# Patient Record
Sex: MALE | Race: WHITE | NOT HISPANIC OR LATINO | Employment: PART TIME | URBAN - METROPOLITAN AREA
[De-identification: names, ages, dates, MRNs, and addresses within clinical notes are randomized per-mention and may not be internally consistent; named-entity substitution may affect disease eponyms.]

---

## 2018-01-10 NOTE — RESULT NOTES
Verified Results  * XR FOOT 3+ VIEW LEFT [id number] 12:59PM Kevin Land Order Number: [id number]     Test Name Result Flag Reference   XR FOOT 3+ VW LEFT (Report)     LEFT FOOT     INDICATION: Chronic medial foot pain  COMPARISON: None available  VIEWS: 3; 3 images     FINDINGS:     Bone mineralization within normal limits  No acute fracture or dislocation  No degenerative changes  No lytic or blastic lesions are seen  Bipartite lateral 1st metatarsal sesamoid bone  Tiny calcaneal spurs  IMPRESSION:     No acute osseous abnormality  Workstation performed: [id number]     Signed by:    Christy Omer DO   2/12/16       Discussion/Summary   X ray of Left foot showed tiny heel spur otherwise normal      - Dr Mahendra Ace   Electronically signed by : Jose Sosa MD; Feb 24 2016 10:29AM EST                       (Author)

## 2018-01-12 NOTE — RESULT NOTES
Verified Results  * MRI FOOT/FOREFOOT TOES LEFT W WO CONTRAST [id number] 07:41PM Dash Boop     Test Name Result Flag Reference   MRI FOOT/FOREFOOT TOES LEFT W [address] (Report)     This is a summary report  The complete report is available in the patient's medical record  If you cannot access the medical record, please contact the sending organization for a detailed fax or copy  MRI - LEFT FOOT WITH AND WITHOUT CONTRAST     INDICATION: Ganglion cyst      COMPARISON:  Plain film of the left foot dated 2/12/2016  TECHNIQUE: MR sequences were obtained of the left foot including the following: Precontrast sequences: localizers, sagittal STIR, sagittal T1, coronal T1, coronal T2 fat sat, axial T2 fat sat, axial PD, coronal T1 fat sat  Postcontrast sequences:    Coronal T1 fat sat, sagittal T1 fat sat  Images were acquired on a 1 5 Nicci unit  12 cc of IV Gadavist was given  FINDINGS:     SUBCUTANEOUS TISSUES: Along the dorsal aspect of the 4th and 5th metatarsal bases there is a lobulated cystic appearing lesion exhibiting decreased T1 with increased T2 signal and thin rim enhancement measuring up to 7 x 8 x 8 mm consistent with a    lobulated ganglion cyst      BONE MARROW: Normal signal intensity  FIRST MTP JOINT: Intact  SESAMOID BONES: Intact  PLANTAR FASCIA: Intact  LISFRANC LIGAMENT: Intact  FOREFOOT TENDONS: Intact  INTERMETATARSAL REGIONS: No bursitis or Hutchinson's neuroma  MUSCULATURE: Intact  IMPRESSION:   Lobulated ganglion cyst identified along the dorsal aspect of the 4th and 5th metatarsal bases   Remaining soft tissues are unremarkable without further evidence of ganglion cyst        Workstation performed: [id number]     Signed by:   Naina Schmidt MD   7/29/16     (1) BUN [id number] 10:44AM Dash Boop     Test Name Result Flag Reference   BLOOD UREA NITROGEN 12 mg/dL  5-25     (1) CREATININE [id number] 10:44AM Dash Boop     Test Name Result Flag Reference   CREATININE 0 73 mg/dL  0 60-1 30   Standardized to IDMS reference method   eGFR Non-African American      >60 0 ml/min/1 73sq m   [facility] [employer] Disease Education Program recommendations are as follows:  GFR calculation is accurate only with a steady state creatinine  Chronic Kidney disease less than 60 ml/min/1 73 sq  meters  Kidney failure less than 15 ml/min/1 73 sq  meters

## 2023-12-08 ENCOUNTER — TELEPHONE (OUTPATIENT)
Age: 33
End: 2023-12-08

## 2023-12-08 NOTE — TELEPHONE ENCOUNTER
Patient called to establish care with a new provider. Reviewed chart to schedule appropriate visit type.

## 2023-12-14 ENCOUNTER — OFFICE VISIT (OUTPATIENT)
Dept: FAMILY MEDICINE CLINIC | Facility: CLINIC | Age: 33
End: 2023-12-14
Payer: COMMERCIAL

## 2023-12-14 VITALS
TEMPERATURE: 97.2 F | HEIGHT: 75 IN | BODY MASS INDEX: 27.48 KG/M2 | HEART RATE: 97 BPM | WEIGHT: 221 LBS | SYSTOLIC BLOOD PRESSURE: 128 MMHG | OXYGEN SATURATION: 97 % | DIASTOLIC BLOOD PRESSURE: 82 MMHG

## 2023-12-14 DIAGNOSIS — N45.1 EPIDIDYMITIS: ICD-10-CM

## 2023-12-14 DIAGNOSIS — Z00.00 ANNUAL PHYSICAL EXAM: Primary | ICD-10-CM

## 2023-12-14 DIAGNOSIS — F41.9 ANXIETY AND DEPRESSION: ICD-10-CM

## 2023-12-14 DIAGNOSIS — Z76.89 ENCOUNTER TO ESTABLISH CARE: ICD-10-CM

## 2023-12-14 DIAGNOSIS — F32.A ANXIETY AND DEPRESSION: ICD-10-CM

## 2023-12-14 DIAGNOSIS — M25.561 CHRONIC PAIN OF RIGHT KNEE: ICD-10-CM

## 2023-12-14 DIAGNOSIS — G89.29 CHRONIC PAIN OF RIGHT KNEE: ICD-10-CM

## 2023-12-14 PROCEDURE — 99395 PREV VISIT EST AGE 18-39: CPT | Performed by: NURSE PRACTITIONER

## 2023-12-14 RX ORDER — DOXYCYCLINE HYCLATE 100 MG/1
100 CAPSULE ORAL EVERY 12 HOURS SCHEDULED
Qty: 14 CAPSULE | Refills: 0 | Status: SHIPPED | OUTPATIENT
Start: 2023-12-14 | End: 2023-12-21

## 2023-12-14 RX ORDER — FLUOXETINE 10 MG/1
10 CAPSULE ORAL DAILY
Qty: 30 CAPSULE | Refills: 1 | Status: SHIPPED | OUTPATIENT
Start: 2023-12-14

## 2023-12-14 NOTE — PROGRESS NOTES
[address] [facility]    NAME: Mike Mercer  AGE: 34 y.o. SEX: male  : 1990     DATE: 12/15/2023     Assessment and Plan:     Problem List Items Addressed This Visit    None  Visit Diagnoses     Annual physical exam    -  Primary    Encounter to establish care        Epididymitis        Relevant Medications    doxycycline hyclate (VIBRAMYCIN) 100 mg capsule    Chronic pain of right knee        Relevant Orders    XR knee 3 vw right non injury    Anxiety and depression        Relevant Medications    FLUoxetine (PROzac) 10 mg capsule        #1 Annual physical exam  #2 Encounter to establish care  Patient was previously being followed at [address] [place] [address] -but his grandmother comes to this practice so it was recommended he come here also  #3 Epididymitis  Discussed with patient plan to treat with a 7-day course of doxycycline 100 mg twice a day  #4 Chronic pain of right knee  Discussed with patient plan to start evaluation with a x-ray of the right knee for further evaluation  #5 Anxiety and depression  Discussed with patient plan to start him on fluoxetine 10 mg daily and recheck effectiveness in 1 month  Patient instructed to return in 1 month for mental health follow-up or sooner if needed  Patient encouraged to call office for problems or concerns in the interim    Immunizations and preventive care screenings were discussed with patient today. Appropriate education was printed on patient's after visit summary. Counseling:  Alcohol/drug use: discussed moderation in alcohol intake, the recommendations for healthy alcohol use, and avoidance of illicit drug use. Dental Health: discussed importance of regular tooth brushing, flossing, and dental visits. Exercise: the importance of regular exercise/physical activity was discussed. Recommend exercise 3-5 times per week for at least 30 minutes. BMI Counseling: Body mass index is 27.62 kg/m². The BMI is above normal. Nutrition recommendations include decreasing portion sizes, encouraging healthy choices of fruits and vegetables, consuming healthier snacks, moderation in carbohydrate intake and increasing intake of lean protein. Exercise recommendations include exercising 3-5 times per week and strength training exercises. Rationale for BMI follow-up plan is due to patient being overweight or obese. Depression Screening and Follow-up Plan: Patient's depression screening was positive with a PHQ-2 score of 6. Their PHQ-9 score was 22. Return in 4 weeks (on 1/11/2024), or F/u mental health. Chief Complaint:     Chief Complaint   Patient presents with   • Physical Exam     Sometimes ears pop and hurt when swallowing. Chest pain    • Establish Care   • Testicle Pain     A month ago, right side   • Anxiety   • Abdominal Pain     Right abdominal pain   • Knee Pain     2017, slipped on ice at work. History of Present Illness:     Adult Annual Physical   Patient here to establish care and for a comprehensive physical exam.  Patient in the past has been seen by Dr. Chucky Jones at the [address] [place] [address] but due to his grandmother coming to this office he wanted to switch providers. The patient reports problems - he has been having right testicular pain for the past month . He states that he was smoking marijuana daily and he stopped when he started with the testicular pain and he also stopped his vitamin B complex and his calcium supplement. He stopped smoking 2 months ago and has been vaping and using the "Nestor' to help with his cravings. He also reports having intermittent ear pain and popping when he swallows. It started out in July of this year and just the left ear but in the last few days it seems to have progressed to his right ear also.   Some of his other medical issues he would like to have addressed is in 2017 he slipped on ice and fell onto his knees and since that time he has been having knee pain with the right being the worst, he found a lump on his left ankle near the Achilles that he would like to have assessed and he also having increased anxiety depression. Patient states that most of his anxiety and depression worsening started when his father was diagnosed with non-Hodgkin's and Hodgkin's lymphoma. He reports that he does not see his biological mother very often and believes that she does have some mental health issues. He states that his mother and father  shortly after losing twin girls soon after birth. His mother currently lives in [place] with his stepfather. Diet and Physical Activity  Diet/Nutrition: well balanced diet and consuming 3-5 servings of fruits/vegetables daily. Exercise: physical active job. Depression Screening  PHQ-2/9 Depression Screening    Little interest or pleasure in doing things: 3 - nearly every day  Feeling down, depressed, or hopeless: 3 - nearly every day  Trouble falling or staying asleep, or sleeping too much: 2 - more than half the days  Feeling tired or having little energy: 2 - more than half the days  Poor appetite or overeating: 3 - nearly every day  Feeling bad about yourself - or that you are a failure or have let yourself or your family down: 3 - nearly every day  Trouble concentrating on things, such as reading the newspaper or watching television: 3 - nearly every day  Moving or speaking so slowly that other people could have noticed.  Or the opposite - being so fidgety or restless that you have been moving around a lot more than usual: 3 - nearly every day  Thoughts that you would be better off dead, or of hurting yourself in some way: 0 - not at all  PHQ-2 Score: 6  PHQ-2 Interpretation: POSITIVE depression screen  PHQ-9 Score: 22   PHQ-9 Interpretation: Severe depression        General Health  Sleep: sleeps well and gets 7-8 hours of sleep on average. Hearing: normal - bilateral.  Vision: no vision problems, goes for regular eye exams, most recent eye exam <1 year ago, and wears glasses. Dental: regular dental visits, brushes teeth once daily, and daily flossing .  Health  History of STDs?: no.    Advanced Care Planning  Do you have an advanced directive? no  Do you have a durable medical power of ? no     Review of Systems:     Review of Systems   Constitutional:  Negative for activity change, appetite change and unexpected weight change. HENT:  Negative for congestion, dental problem, ear pain, hearing loss, nosebleeds, sneezing, sore throat, tinnitus and trouble swallowing. Eyes:  Negative for visual disturbance. Respiratory:  Negative for cough, chest tightness, shortness of breath and wheezing. Cardiovascular:  Negative for chest pain, palpitations and leg swelling. Gastrointestinal:  Negative for abdominal distention, abdominal pain, constipation, diarrhea and nausea. Endocrine: Negative for polydipsia, polyphagia and polyuria. Genitourinary:  Positive for testicular pain. Negative for frequency, penile discharge, penile pain, penile swelling, scrotal swelling and urgency. Musculoskeletal:  Positive for arthralgias. Negative for back pain, gait problem, joint swelling, myalgias and neck pain. Skin:  Negative for color change and rash. Allergic/Immunologic: Negative for environmental allergies. Neurological:  Negative for dizziness, weakness, light-headedness and headaches. Hematological: Negative. Psychiatric/Behavioral:  Positive for dysphoric mood. Negative for sleep disturbance. The patient is nervous/anxious. Past Medical History:     Past Medical History:   Diagnosis Date   • GERD (gastroesophageal reflux disease)       Past Surgical History:     History reviewed. No pertinent surgical history.    Social History:     Social History     Socioeconomic History   • Marital status: Single     Spouse name: None   • Number of children: None   • Years of education: None   • Highest education level: None   Occupational History   • None   Tobacco Use   • Smoking status: Every Day     Types: Cigarettes, E-Cigarettes     Passive exposure: Current   • Smokeless tobacco: Current     Types: Chew   • Tobacco comments:     I have been using chew tobacco, cigarettes, and vape since age 27   Vaping Use   • Vaping status: Former   Substance and Sexual Activity   • Alcohol use: No   • Drug use: Not Currently     Types: Marijuana, Methamphetamines     Comment: patient reports marijuana and snorts methamphetamines   • Sexual activity: None   Other Topics Concern   • None   Social History Narrative   • None     Social Determinants of Health     Financial Resource Strain: Not on file   Food Insecurity: Not on file   Transportation Needs: Not on file   Physical Activity: Not on file   Stress: Not on file   Social Connections: Not on file   Intimate Partner Violence: Not on file   Housing Stability: Not on file      Family History:     Family History   Problem Relation Age of Onset   • Migraines Mother    • Alopecia Mother    • Anxiety disorder Mother    • Hodgkin's lymphoma Father    • Heart disease Paternal Grandfather    • Cancer Paternal Grandfather       Current Medications:     Current Outpatient Medications   Medication Sig Dispense Refill   • doxycycline hyclate (VIBRAMYCIN) 100 mg capsule Take 1 capsule (100 mg total) by mouth every 12 (twelve) hours for 7 days 14 capsule 0   • esomeprazole (NexIUM) 40 MG capsule Take 40 mg by mouth every morning before breakfast.     • FLUoxetine (PROzac) 10 mg capsule Take 1 capsule (10 mg total) by mouth daily 30 capsule 1     No current facility-administered medications for this visit. Allergies:      Allergies   Allergen Reactions   • Mushroom Extract Complex - Food Allergy Other (See Comments)     "i think i get an itchy throat"   • Pollen Extract Headache Physical Exam:     /82 (BP Location: Right arm, Patient Position: Sitting, Cuff Size: Large)   Pulse 97   Temp (!) 97.2 °F (36.2 °C)   Ht 6' 3" (1.905 m)   Wt 100 kg (221 lb)   SpO2 97%   BMI 27.62 kg/m² (Reviewed)    Physical Exam  Vitals reviewed. Exam conducted with a chaperone present. Constitutional:       General: He is not in acute distress. Appearance: He is well-developed and well-groomed. He is not ill-appearing. HENT:      Head: Normocephalic and atraumatic. Right Ear: External ear normal.      Left Ear: External ear normal.      Nose: Nose normal.      Mouth/Throat:      Lips: Pink. Mouth: Mucous membranes are moist.      Dentition: Normal dentition. Pharynx: Oropharynx is clear. Eyes:      General: Lids are normal.      Extraocular Movements: Extraocular movements intact. Conjunctiva/sclera: Conjunctivae normal.      Pupils: Pupils are equal, round, and reactive to light. Neck:      Thyroid: No thyromegaly or thyroid tenderness. Trachea: Trachea and phonation normal.   Cardiovascular:      Rate and Rhythm: Normal rate and regular rhythm. Pulses:           Radial pulses are 2+ on the right side and 2+ on the left side. Dorsalis pedis pulses are 2+ on the right side and 2+ on the left side. Posterior tibial pulses are 2+ on the right side and 2+ on the left side. Heart sounds: Normal heart sounds. No murmur heard. Pulmonary:      Effort: Pulmonary effort is normal.      Breath sounds: Normal breath sounds. Abdominal:      General: Abdomen is flat. Bowel sounds are normal. There is no distension. Palpations: Abdomen is soft. Tenderness: There is no abdominal tenderness. Genitourinary:     Penis: Normal.       Testes: Cremasteric reflex is present. Right: Tenderness present. Mass, swelling, testicular hydrocele or varicocele not present.    Musculoskeletal:      Right knee: No swelling, deformity, effusion, bony tenderness or crepitus. Normal range of motion. Tenderness present over the patellar tendon. No LCL laxity, MCL laxity, ACL laxity or PCL laxity. Normal alignment, normal meniscus and normal patellar mobility. Normal pulse. Instability Tests: Anterior drawer test negative. Posterior drawer test negative. Anterior Lachman test negative. Medial Williams test negative and lateral Williams test negative. Right lower leg: No edema. Left lower leg: No edema. Skin:     General: Skin is warm and dry. Capillary Refill: Capillary refill takes less than 2 seconds. Nails: There is no clubbing. Neurological:      Mental Status: He is alert and oriented to person, place, and time. Sensory: Sensation is intact. Motor: Motor function is intact. Coordination: Coordination is intact. Deep Tendon Reflexes: Reflexes are normal and symmetric. Psychiatric:         Mood and Affect: Mood normal.         Speech: Speech normal.         Behavior: Behavior normal. Behavior is cooperative.           [address] [facility]

## 2023-12-21 ENCOUNTER — TELEPHONE (OUTPATIENT)
Age: 33
End: 2023-12-21

## 2023-12-21 NOTE — TELEPHONE ENCOUNTER
"Patient was treated for epididymitis with doxycycline.  He does not feel that it worked because he is still with \"same problem\", still with pain as well...  he looked it up online, it said if it is going on longer than 6 wks that it might be chronic. Would that be different treatment then, he feels like it has been longer than 6 wks.         "

## 2023-12-21 NOTE — TELEPHONE ENCOUNTER
Patient returned call to follow up earlier call and requesting response before he goes to work. 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 Patient reports ongoing testicle pain that is interfering with his life. Does patient require an additional course of antibiotic therapy? Please follow up with patient. If no answer while he at work this afternoon, please leave a voicemail for patient to return call.     If unsuccessful reaching patient this afternoon, please reach out Friday 12/22 between 9 AM-12 noon. Thank you.

## 2023-12-22 ENCOUNTER — TELEPHONE (OUTPATIENT)
Age: 33
End: 2023-12-22

## 2023-12-22 NOTE — TELEPHONE ENCOUNTER
Pt stated there is incorrect information in his chart.  For example, he claims there was incorrect medication info etc. Pt wants to know where info came from and need copies of specific info on his chart.           Please contact patient and advise.     Thank you for your help.

## 2023-12-22 NOTE — TELEPHONE ENCOUNTER
Pt called back. I confirmed we have the correct cell number for him. He did not answer our calls as he did not recognize the number.     Providers recommendation given. He asked if he could go to [facility], I advised he could not as they could not to the appropriate testing. Pt will probably go to [facility].

## 2023-12-22 NOTE — TELEPHONE ENCOUNTER
Patient wants a print out of his chart. Offered and sent [employer] setup, but didn't want to enter SSN. Mailing the patient snapshot.

## 2023-12-22 NOTE — TELEPHONE ENCOUNTER
Attempted to call again same message about google. Asked Serena to try to make sure it wasn't my phone and it would not even ring.

## 2024-01-11 ENCOUNTER — APPOINTMENT (OUTPATIENT)
Dept: RADIOLOGY | Facility: CLINIC | Age: 34
End: 2024-01-11
Payer: COMMERCIAL

## 2024-01-11 DIAGNOSIS — M25.561 CHRONIC PAIN OF RIGHT KNEE: ICD-10-CM

## 2024-01-11 DIAGNOSIS — G89.29 CHRONIC PAIN OF RIGHT KNEE: ICD-10-CM

## 2024-01-11 PROCEDURE — 73562 X-RAY EXAM OF KNEE 3: CPT

## 2024-01-15 ENCOUNTER — OFFICE VISIT (OUTPATIENT)
Dept: FAMILY MEDICINE CLINIC | Facility: CLINIC | Age: 34
End: 2024-01-15
Payer: COMMERCIAL

## 2024-01-15 VITALS
HEIGHT: 75 IN | BODY MASS INDEX: 29.09 KG/M2 | SYSTOLIC BLOOD PRESSURE: 130 MMHG | HEART RATE: 70 BPM | TEMPERATURE: 97.2 F | DIASTOLIC BLOOD PRESSURE: 84 MMHG | WEIGHT: 234 LBS | OXYGEN SATURATION: 100 %

## 2024-01-15 DIAGNOSIS — F32.A ANXIETY AND DEPRESSION: Primary | ICD-10-CM

## 2024-01-15 DIAGNOSIS — J02.9 ACUTE PHARYNGITIS, UNSPECIFIED ETIOLOGY: ICD-10-CM

## 2024-01-15 DIAGNOSIS — N45.1 EPIDIDYMITIS: ICD-10-CM

## 2024-01-15 DIAGNOSIS — F41.9 ANXIETY AND DEPRESSION: Primary | ICD-10-CM

## 2024-01-15 PROCEDURE — 99214 OFFICE O/P EST MOD 30 MIN: CPT | Performed by: NURSE PRACTITIONER

## 2024-01-15 RX ORDER — AZITHROMYCIN 250 MG/1
TABLET, FILM COATED ORAL
Qty: 6 TABLET | Refills: 0 | Status: SHIPPED | OUTPATIENT
Start: 2024-01-15 | End: 2024-01-19

## 2024-01-15 NOTE — PROGRESS NOTES
Assessment/Plan:     Diagnoses and all orders for this visit:    Anxiety and depression    Acute pharyngitis, unspecified etiology  -     azithromycin (ZITHROMAX) 250 mg tablet; Take 2 tabs on Day 1 than 1 tab Days 2-5    Epididymitis  -     US scrotum and groin area; Future        #1 Anxiety and depression  Patient continues to have depression symptoms but feels like the sertraline 10 mg daily and does not want to adjust dosage.   #2 Acute pharyngitis  Discussed with patient plan to treat with a course of azithromycin  #3 Epididymitis  Discussed with patient plan to obtain an ultrasound of scrotal to further evaluate  Discussed with patient plan to follow-up in one month or sooner if needed    Subjective:      Patient ID: Javier Mercer is a 33 y.o. male.    33 y.o.male presenting for a one month follow-up for his depression after being started on fluoxetine. He reports that his father has been placed on hospice which has made his depression worsen. He is not interested in adjusting dosage of the sertraline at this time. He denies having any plan or suicidal ideation. He also reports having left ear pain and left sided sore throat that he would like addressed. He denies fever, chills, generalized body aches, cough, shortness of breath or headache.He also continues to have scotaL discomfort and requesting an ultrasound to further evaluate.       The following portions of the patient's history were reviewed and updated as appropriate: allergies, current medications, past family history, past medical history, past social history, past surgical history, and problem list.    Review of Systems   Constitutional:  Negative for chills, fatigue and fever.   HENT:  Positive for ear pain and sore throat. Negative for congestion, postnasal drip, rhinorrhea, sinus pressure and sinus pain.    Respiratory:  Negative for cough, chest tightness, shortness of breath and wheezing.    Cardiovascular: Negative.    Gastrointestinal:  "Negative.    Musculoskeletal:  Negative for myalgias.   Neurological:  Negative for dizziness, light-headedness and headaches.   Psychiatric/Behavioral:  Positive for dysphoric mood. Negative for self-injury, sleep disturbance and suicidal ideas.          Objective:    /84 (BP Location: Left arm, Patient Position: Sitting, Cuff Size: Large)   Pulse 70   Temp (!) 97.2 °F (36.2 °C)   Ht 6' 3\" (1.905 m)   Wt 106 kg (234 lb)   SpO2 100%   BMI 29.25 kg/m² (Reviewed)       Physical Exam  Vitals reviewed.   Constitutional:       General: He is not in acute distress.     Appearance: He is well-developed and well-groomed. He is not ill-appearing.   HENT:      Head: Normocephalic and atraumatic.      Right Ear: Tympanic membrane, ear canal and external ear normal.      Left Ear: Ear canal and external ear normal. Tympanic membrane is injected.      Nose: Nose normal.      Mouth/Throat:      Pharynx: Posterior oropharyngeal erythema present.   Eyes:      General: Lids are normal.      Extraocular Movements: Extraocular movements intact.      Conjunctiva/sclera: Conjunctivae normal.      Pupils: Pupils are equal, round, and reactive to light.   Neck:      Trachea: Trachea and phonation normal.   Cardiovascular:      Rate and Rhythm: Normal rate and regular rhythm.      Heart sounds: Normal heart sounds.   Pulmonary:      Effort: Pulmonary effort is normal.      Breath sounds: Normal breath sounds.   Skin:     General: Skin is warm and dry.      Capillary Refill: Capillary refill takes less than 2 seconds.   Neurological:      Mental Status: He is alert and oriented to person, place, and time.   Psychiatric:         Mood and Affect: Mood is depressed.         Speech: Speech normal.         Behavior: Behavior normal. Behavior is cooperative.      Comments: PHQ-9 = 16           "

## 2024-01-29 ENCOUNTER — RA CDI HCC (OUTPATIENT)
Dept: OTHER | Facility: HOSPITAL | Age: 34
End: 2024-01-29

## 2024-02-19 ENCOUNTER — OFFICE VISIT (OUTPATIENT)
Dept: FAMILY MEDICINE CLINIC | Facility: CLINIC | Age: 34
End: 2024-02-19
Payer: COMMERCIAL

## 2024-02-19 VITALS
HEART RATE: 73 BPM | OXYGEN SATURATION: 100 % | WEIGHT: 247.5 LBS | TEMPERATURE: 97.5 F | BODY MASS INDEX: 30.77 KG/M2 | DIASTOLIC BLOOD PRESSURE: 86 MMHG | SYSTOLIC BLOOD PRESSURE: 126 MMHG | HEIGHT: 75 IN

## 2024-02-19 DIAGNOSIS — F32.A ANXIETY AND DEPRESSION: Primary | ICD-10-CM

## 2024-02-19 DIAGNOSIS — F41.9 ANXIETY AND DEPRESSION: Primary | ICD-10-CM

## 2024-02-19 PROCEDURE — 99214 OFFICE O/P EST MOD 30 MIN: CPT | Performed by: NURSE PRACTITIONER

## 2024-02-19 NOTE — PROGRESS NOTES
"Assessment/Plan:     Diagnoses and all orders for this visit:    Anxiety and depression        Discussed with patient plan to continue him on fluoxetine 10 mg daily  Patient instructed to call if no improvement in 72 hours or symptoms worsen    Subjective:      Patient ID: Javier Mercer is a 33 y.o. male.    33 y.o.male presenting for a 4 week follow-up after being started on medication for anxiety and depression. He started that he is feeling better but still as the good and bad days. He is not interested in increasing the dosage. He continues to live with his grandmother and they are helping each other with the loss of his father.        The following portions of the patient's history were reviewed and updated as appropriate: allergies, current medications, past family history, past medical history, past social history, past surgical history, and problem list.    Review of Systems   Constitutional: Negative.    Respiratory: Negative.     Cardiovascular: Negative.    Gastrointestinal: Negative.    Musculoskeletal: Negative.    Neurological: Negative.    Psychiatric/Behavioral:  Positive for dysphoric mood. The patient is nervous/anxious.          Objective:    /86 (BP Location: Right arm, Patient Position: Sitting, Cuff Size: Large)   Pulse 73   Temp 97.5 °F (36.4 °C)   Ht 6' 3\" (1.905 m)   Wt 112 kg (247 lb 8 oz)   SpO2 100%   BMI 30.94 kg/m² (Reviewed)       Physical Exam  Vitals reviewed.   Constitutional:       General: He is not in acute distress.     Appearance: He is well-developed and well-groomed. He is not ill-appearing.   HENT:      Head: Normocephalic and atraumatic.   Eyes:      General: Lids are normal.      Extraocular Movements: Extraocular movements intact.      Conjunctiva/sclera: Conjunctivae normal.      Pupils: Pupils are equal, round, and reactive to light.   Neck:      Trachea: Trachea and phonation normal.   Cardiovascular:      Rate and Rhythm: Normal rate and regular " rhythm.      Heart sounds: Normal heart sounds.   Pulmonary:      Effort: Pulmonary effort is normal.      Breath sounds: Normal breath sounds.   Skin:     General: Skin is warm and dry.      Capillary Refill: Capillary refill takes less than 2 seconds.   Neurological:      Mental Status: He is alert and oriented to person, place, and time.   Psychiatric:         Mood and Affect: Mood is anxious and depressed.         Speech: Speech normal.         Behavior: Behavior normal. Behavior is cooperative.         Thought Content: Thought content normal.

## 2024-07-12 DIAGNOSIS — F32.A ANXIETY AND DEPRESSION: ICD-10-CM

## 2024-07-12 DIAGNOSIS — F41.9 ANXIETY AND DEPRESSION: ICD-10-CM

## 2024-07-12 RX ORDER — FLUOXETINE 10 MG/1
10 CAPSULE ORAL DAILY
Qty: 100 CAPSULE | Refills: 1 | Status: SHIPPED | OUTPATIENT
Start: 2024-07-12

## 2024-12-06 ENCOUNTER — TELEPHONE (OUTPATIENT)
Age: 34
End: 2024-12-06

## 2024-12-06 NOTE — TELEPHONE ENCOUNTER
Pt called to scheduled for back and neck pain that he got at work. I offered a Mon appt but pt was not sure if he should tell his work first. I offered to call to see if he could be squeezed in today but pt declined and said he would check with work.

## 2024-12-17 ENCOUNTER — RA CDI HCC (OUTPATIENT)
Dept: OTHER | Facility: HOSPITAL | Age: 34
End: 2024-12-17

## 2024-12-17 NOTE — PROGRESS NOTES
HCC coding opportunities          Chart Reviewed number of suggestions sent to Provider: 1     Patients Insurance        Commercial Insurance: [employer]       Can Depression (F32.A) be further classified using any of these diagnosis, please pick any one if applicable per current status and assess for 2024    F322 Major depressive disorder, single episode, severe without psychotic features   F323 Major depressive disorder, single episode, severe with psychotic features   F332 Major depressive disorder, recurrent severe without psychotic features   F333 Major depressive disorder, recurrent, severe with psychotic symptoms

## 2024-12-19 ENCOUNTER — OFFICE VISIT (OUTPATIENT)
Dept: FAMILY MEDICINE CLINIC | Facility: CLINIC | Age: 34
End: 2024-12-19
Payer: COMMERCIAL

## 2024-12-19 VITALS
HEIGHT: 75 IN | HEART RATE: 95 BPM | WEIGHT: 232 LBS | TEMPERATURE: 97.7 F | BODY MASS INDEX: 28.85 KG/M2 | SYSTOLIC BLOOD PRESSURE: 120 MMHG | DIASTOLIC BLOOD PRESSURE: 86 MMHG | OXYGEN SATURATION: 100 %

## 2024-12-19 DIAGNOSIS — M25.511 CHRONIC RIGHT SHOULDER PAIN: ICD-10-CM

## 2024-12-19 DIAGNOSIS — M89.8X1 PAIN OF RIGHT CLAVICLE: ICD-10-CM

## 2024-12-19 DIAGNOSIS — Z00.00 ANNUAL PHYSICAL EXAM: Primary | ICD-10-CM

## 2024-12-19 DIAGNOSIS — G89.29 CHRONIC RIGHT SHOULDER PAIN: ICD-10-CM

## 2024-12-19 DIAGNOSIS — Z11.1 SCREENING FOR TUBERCULOSIS: ICD-10-CM

## 2024-12-19 PROCEDURE — 99395 PREV VISIT EST AGE 18-39: CPT | Performed by: NURSE PRACTITIONER

## 2024-12-19 NOTE — PATIENT INSTRUCTIONS
"Patient Education     Routine physical for adults   The Basics   Written by the doctors and editors at [facility]   What is a physical? -- A physical is a routine visit, or \"check-up,\" with your doctor. You might also hear it called a \"wellness visit\" or \"preventive visit.\"  During each visit, the doctor will:   Ask about your physical and mental health   Ask about your habits, behaviors, and lifestyle   Do an exam   Give you vaccines if needed   Talk to you about any medicines you take   Give advice about your health   Answer your questions  Getting regular check-ups is an important part of taking care of your health. It can help your doctor find and treat any problems you have. But it's also important for preventing health problems.  A routine physical is different from a \"sick visit.\" A sick visit is when you see a doctor because of a health concern or problem. Since physicals are scheduled ahead of time, you can think about what you want to ask the doctor.  How often should I get a physical? -- It depends on your age and health. In general, for people age 21 years and older:   If you are younger than 50 years, you might be able to get a physical every 3 years.   If you are 50 years or older, your doctor might recommend a physical every year.  If you have an ongoing health condition, like diabetes or high blood pressure, your doctor will probably want to see you more often.  What happens during a physical? -- In general, each visit will include:   Physical exam - The doctor or nurse will check your height, weight, heart rate, and blood pressure. They will also look at your eyes and ears. They will ask about how you are feeling and whether you have any symptoms that bother you.   Medicines - It's a good idea to bring a list of all the medicines you take to each doctor visit. Your doctor will talk to you about your medicines and answer any questions. Tell them if you are having any side effects that bother you. You " "should also tell them if you are having trouble paying for any of your medicines.   Habits and behaviors - This includes:   Your diet   Your exercise habits   Whether you smoke, drink alcohol, or use drugs   Whether you are sexually active   Whether you feel safe at home  Your doctor will talk to you about things you can do to improve your health and lower your risk of health problems. They will also offer help and support. For example, if you want to quit smoking, they can give you advice and might prescribe medicines. If you want to improve your diet or get more physical activity, they can help you with this, too.   Lab tests, if needed - The tests you get will depend on your age and situation. For example, your doctor might want to check your:   Cholesterol   Blood sugar   Iron level   Vaccines - The recommended vaccines will depend on your age, health, and what vaccines you already had. Vaccines are very important because they can prevent certain serious or deadly infections.   Discussion of screening - \"Screening\" means checking for diseases or other health problems before they cause symptoms. Your doctor can recommend screening based on your age, risk, and preferences. This might include tests to check for:   Cancer, such as breast, prostate, cervical, ovarian, colorectal, prostate, lung, or skin cancer   Sexually transmitted infections, such as chlamydia and gonorrhea   Mental health conditions like depression and anxiety  Your doctor will talk to you about the different types of screening tests. They can help you decide which screenings to have. They can also explain what the results might mean.   Answering questions - The physical is a good time to ask the doctor or nurse questions about your health. If needed, they can refer you to other doctors or specialists, too.  Adults older than 65 years often need other care, too. As you get older, your doctor will talk to you about:   How to prevent falling at " home   Hearing or vision tests   Memory testing   How to take your medicines safely   Making sure that you have the help and support you need at home  All topics are updated as new evidence becomes available and our peer review process is complete.  This topic retrieved from [employer] on: May 02, 2024.  Topic 570451 Version 1.0  Release: 32.4.3 - C32.122  © 2024 UpToDate, Inc. and/or its affiliates. All rights reserved.  Consumer Information Use and Disclaimer   Disclaimer: This generalized information is a limited summary of diagnosis, treatment, and/or medication information. It is not meant to be comprehensive and should be used as a tool to help the user understand and/or assess potential diagnostic and treatment options. It does NOT include all information about conditions, treatments, medications, side effects, or risks that may apply to a specific patient. It is not intended to be medical advice or a substitute for the medical advice, diagnosis, or treatment of a health care provider based on the health care provider's examination and assessment of a patient's specific and unique circumstances. Patients must speak with a health care provider for complete information about their health, medical questions, and treatment options, including any risks or benefits regarding use of medications. This information does not endorse any treatments or medications as safe, effective, or approved for treating a specific patient. UpToDate, Inc. and its affiliates disclaim any warranty or liability relating to this information or the use thereof.The use of this information is governed by the Terms of Use, available at https://www.[employer].com/en/know/clinical-effectiveness-terms. 2024© UpToDate, Inc. and its affiliates and/or licensors. All rights reserved.  Copyright   © 2024 UpToDate, Inc. and/or its affiliates. All rights reserved.

## 2024-12-19 NOTE — PROGRESS NOTES
Adult Annual Physical  Name: Javier Mercer      : 1990      MRN: [id number]  Encounter Provider: BONG Damon  Encounter Date: 2024   Encounter department: [facility]    Assessment & Plan  Annual physical exam    Orders:  •  Comprehensive metabolic panel; Future  •  Hemoglobin A1C; Future  •  Lipid panel; Future  •  CBC and differential; Future    Pain of right clavicle  Patient had a clavicle fracture with surgical repair in   and since that time his clavicle has not been right per patient  Discussed with patient plan to obtain clavicle  x-ray to evaluate for malalignment  Orders:  •  XR clavicle right; Future    Chronic right shoulder pain  Discussed with patient plan to obtain shoulder x-ray to evaluate for malalignment  Orders:  •  XR shoulder 2+ vw right; Future    Screening for tuberculosis  Someone that he works with was recently diagnosed with tuberculosis so work told him he should be screened  Orders:  •  Quantiferon TB Gold Plus Assay; Future    Immunizations and preventive care screenings were discussed with patient today. Appropriate education was printed on patient's after visit summary.    Counseling:  Alcohol/drug use: discussed moderation in alcohol intake, the recommendations for healthy alcohol use, and avoidance of illicit drug use.  Dental Health: discussed importance of regular tooth brushing, flossing, and dental visits.  Sexual health: discussed sexually transmitted diseases, partner selection, use of condoms, avoidance of unintended pregnancy, and contraceptive alternatives.  Exercise: the importance of regular exercise/physical activity was discussed. Recommend exercise 3-5 times per week for at least 30 minutes.       Depression Screening and Follow-up Plan: Patient was screened for depression during today's encounter. They screened negative with a PHQ-9 score of 1.    Tobacco Cessation Counseling: Tobacco cessation counseling was  provided. The patient is sincerely urged to quit consumption of tobacco. He is ready to quit tobacco. Medication options and side effects of medication discussed. Patient agreed to medication. Patient is going to check out what type of program his employer offers        History of Present Illness     Adult Annual Physical:  Patient presents for annual physical. Patient here for a comprehensive physical exam. The patient reports he is having right clavicle and right shoulder pains when doing chest exercises at the gym. He wants to know if there is anything he can do to make the pains resolve. He also has been using Zyn (nicotine substitute) and Nitroderm gum to stop smoking but not helping much. He is going to look into what his employer offers to stop smoking.  .     Diet and Physical Activity:  - Diet/Nutrition: well balanced diet and consuming 3-5 servings of fruits/vegetables daily.  - Exercise: moderate cardiovascular exercise, 3-4 times a week on average and 30-60 minutes on average.    Depression Screening:    - PHQ-9 Score: 1    General Health:  - Sleep: sleeps well and 7-8 hours of sleep on average.  - Hearing: normal hearing bilateral ears.  - Vision: no vision problems.  - Dental: regular dental visits and brushes teeth twice daily.     Health:  - History of STDs: no.   - Urinary symptoms: none.     Advanced Care Planning:  - Has an advanced directive?: no    - Has a durable medical POA?: no    - ACP document given to patient?: no      Review of Systems   Constitutional:  Negative for activity change, appetite change and unexpected weight change.   HENT:  Negative for congestion, dental problem, ear pain, hearing loss, nosebleeds, sneezing, sore throat, tinnitus and trouble swallowing.    Eyes:  Negative for visual disturbance.   Respiratory:  Negative for cough, chest tightness, shortness of breath and wheezing.    Cardiovascular:  Negative for chest pain, palpitations and leg swelling.  "  Gastrointestinal:  Negative for abdominal distention, abdominal pain, constipation, diarrhea and nausea.        Hemorroid    Endocrine: Negative for polydipsia and polyuria.   Genitourinary:  Positive for testicular pain.   Musculoskeletal:  Positive for arthralgias and myalgias. Negative for back pain and neck pain.   Skin:  Negative for color change and rash.   Allergic/Immunologic: Negative for environmental allergies.   Neurological:  Negative for dizziness, weakness, light-headedness and headaches.   Hematological: Negative.    Psychiatric/Behavioral:  Positive for decreased concentration. Negative for dysphoric mood and sleep disturbance. The patient is not nervous/anxious.      Current Outpatient Medications on File Prior to Visit   Medication Sig Dispense Refill   • esomeprazole (NexIUM) 40 MG capsule Take 40 mg by mouth every morning before breakfast.     • [DISCONTINUED] FLUoxetine (PROzac) 10 mg capsule TAKE 1 CAPSULE BY MOUTH EVERY DAY (Patient not taking: Reported on 12/19/2024) 100 capsule 1     No current facility-administered medications on file prior to visit.        Objective   /86 (BP Location: Right arm, Patient Position: Sitting, Cuff Size: Large)   Pulse 95   Temp 97.7 °F (36.5 °C) (Temporal)   Ht 6' 3\" (1.905 m)   Wt 105 kg (232 lb)   SpO2 100%   BMI 29.00 kg/m² (Reviewed)    Physical Exam  Vitals reviewed.   Constitutional:       General: He is not in acute distress.     Appearance: He is well-developed and well-groomed. He is not ill-appearing.   HENT:      Head: Normocephalic and atraumatic.      Right Ear: Tympanic membrane, ear canal and external ear normal.      Left Ear: Tympanic membrane, ear canal and external ear normal.      Nose: Nose normal.      Mouth/Throat:      Lips: Pink.      Mouth: Mucous membranes are moist.      Dentition: Normal dentition.      Pharynx: Oropharynx is clear.   Eyes:      General: Lids are normal.      Extraocular Movements: Extraocular " movements intact.      Conjunctiva/sclera: Conjunctivae normal.      Pupils: Pupils are equal, round, and reactive to light.   Neck:      Thyroid: No thyromegaly or thyroid tenderness.      Trachea: Trachea and phonation normal.   Cardiovascular:      Rate and Rhythm: Normal rate and regular rhythm.      Pulses: Normal pulses.           Radial pulses are 2+ on the right side and 2+ on the left side.        Dorsalis pedis pulses are 2+ on the right side and 2+ on the left side.        Posterior tibial pulses are 2+ on the right side and 2+ on the left side.      Heart sounds: Normal heart sounds. No murmur heard.  Pulmonary:      Effort: Pulmonary effort is normal.      Breath sounds: Normal breath sounds.   Abdominal:      General: Abdomen is flat. Bowel sounds are normal. There is no distension.      Palpations: Abdomen is soft.      Tenderness: There is no abdominal tenderness.   Musculoskeletal:      Right shoulder: Tenderness present. No swelling, deformity, effusion, bony tenderness or crepitus. Normal range of motion. Normal strength. Normal pulse.        Arms:       Cervical back: Neck supple.      Right lower leg: No edema.      Left lower leg: No edema.   Skin:     General: Skin is warm and dry.      Capillary Refill: Capillary refill takes less than 2 seconds.   Neurological:      General: No focal deficit present.      Mental Status: He is alert and oriented to person, place, and time.      Cranial Nerves: Cranial nerves 2-12 are intact.      Coordination: Coordination is intact.   Psychiatric:         Mood and Affect: Mood normal.         Speech: Speech normal.         Behavior: Behavior normal. Behavior is cooperative.

## 2024-12-22 ENCOUNTER — HOSPITAL ENCOUNTER (OUTPATIENT)
Dept: ULTRASOUND IMAGING | Facility: HOSPITAL | Age: 34
Discharge: HOME | End: 2024-12-22
Payer: COMMERCIAL

## 2024-12-22 DIAGNOSIS — N45.1 EPIDIDYMITIS: ICD-10-CM

## 2024-12-22 PROCEDURE — 76870 US EXAM SCROTUM: CPT

## 2024-12-28 ENCOUNTER — APPOINTMENT (OUTPATIENT)
Dept: RADIOLOGY | Facility: CLINIC | Age: 34
End: 2024-12-28
Payer: COMMERCIAL

## 2024-12-28 DIAGNOSIS — G89.29 CHRONIC RIGHT SHOULDER PAIN: ICD-10-CM

## 2024-12-28 DIAGNOSIS — M25.511 CHRONIC RIGHT SHOULDER PAIN: ICD-10-CM

## 2024-12-28 DIAGNOSIS — M89.8X1 PAIN OF RIGHT CLAVICLE: ICD-10-CM

## 2024-12-28 PROCEDURE — 73030 X-RAY EXAM OF SHOULDER: CPT

## 2024-12-28 PROCEDURE — 73000 X-RAY EXAM OF COLLAR BONE: CPT

## 2024-12-31 ENCOUNTER — RESULTS FOLLOW-UP (OUTPATIENT)
Dept: FAMILY MEDICINE CLINIC | Facility: CLINIC | Age: 34
End: 2024-12-31

## 2024-12-31 DIAGNOSIS — M89.8X1 CLAVICLE PAIN: Primary | ICD-10-CM

## 2025-01-19 ENCOUNTER — HOSPITAL ENCOUNTER (OUTPATIENT)
Dept: MRI IMAGING | Facility: HOSPITAL | Age: 35
Discharge: HOME | End: 2025-01-19

## 2025-01-19 DIAGNOSIS — M89.8X1 CLAVICLE PAIN: ICD-10-CM

## 2025-07-03 ENCOUNTER — OFFICE VISIT (OUTPATIENT)
Dept: URGENT CARE | Facility: CLINIC | Age: 35
End: 2025-07-03
Payer: COMMERCIAL

## 2025-07-03 ENCOUNTER — APPOINTMENT (OUTPATIENT)
Dept: RADIOLOGY | Facility: CLINIC | Age: 35
End: 2025-07-03
Payer: COMMERCIAL

## 2025-07-03 VITALS
HEART RATE: 77 BPM | OXYGEN SATURATION: 99 % | SYSTOLIC BLOOD PRESSURE: 124 MMHG | TEMPERATURE: 98.4 F | DIASTOLIC BLOOD PRESSURE: 80 MMHG

## 2025-07-03 DIAGNOSIS — M79.674 TOE PAIN, RIGHT: ICD-10-CM

## 2025-07-03 DIAGNOSIS — S92.524A CLOSED NONDISPLACED FRACTURE OF MIDDLE PHALANX OF LESSER TOE OF RIGHT FOOT, INITIAL ENCOUNTER: Primary | ICD-10-CM

## 2025-07-03 PROCEDURE — 73660 X-RAY EXAM OF TOE(S): CPT

## 2025-07-03 NOTE — PROGRESS NOTES
[facility]  Name: Javier Mercer      : 1990      MRN: [id number]  Encounter Provider: Louise Holt PA-C  Encounter Date: 7/3/2025   Encounter department: [facility]  :  Assessment & Plan  Open nondisplaced fracture of middle phalanx of lesser toe of right foot, initial encounter    Orders:    Ambulatory Referral to Podiatry; Future    Presents with symptoms and examination concerning for possible fracture of the right second toe.  Recommend x-ray for further evaluation.  X-ray demonstrates what appears to be a fracture through the proximal portion of the middle phalanx of the toe.  Patient is placed in dynamic splint with cade tape and referred to podiatry.  Discussed RICE modalities as well.    Patient Instructions  Follow up with PCP in 3-5 days.  Proceed to  ER if symptoms worsen.    If tests are performed, our office will contact you with results only if changes need to made to the care plan discussed with you at the visit. [facility]    Chief Complaint:   Chief Complaint   Patient presents with    Pain    Joint Pain     Right 2nd toe pain, swollen, bruised, and 5/10 pain with walking and use of right foot      History of Present Illness   34-year-old male presents with complaint of right second toe pain.  Patient reports that he woke with pain and is worse with ambulating.  Patient states that he does frequently had his feet on things and is unsure if he had any trauma to the area.  He states he did drink last night and does get a little more clumsy when he drinks however he states he did not drink and to excess.  Notes some numbness and tingling as well.      History obtained from: patient    Review of Systems   Musculoskeletal:  Positive for gait problem.     Past Medical History   Past Medical History[1]  Past Surgical History[2]  Family History[3]  he reports that he quit smoking about 17 months ago. His  smoking use included cigarettes and e-cigarettes. He has been exposed to tobacco smoke. His smokeless tobacco use includes chew. He reports that he does not currently use drugs after having used the following drugs: Marijuana and Methamphetamines. He reports that he does not drink alcohol.  Current Outpatient Medications   Medication Instructions    esomeprazole (NEXIUM) 40 mg, Every morning before breakfast   Allergies[4]     Objective   /80   Pulse 77   Temp 98.4 °F (36.9 °C)   SpO2 99%      Physical Exam  Vitals and nursing note reviewed.   Constitutional:       General: He is awake. He is not in acute distress.  HENT:      Head: Normocephalic and atraumatic.      Right Ear: Hearing and external ear normal.      Left Ear: Hearing and external ear normal.      Nose: No nasal deformity.      Mouth/Throat:      Lips: Pink. No lesions.   Feet:      Comments: Right cock up/hammertoe deformity of the second toe noted.  There is erythema over the proximal interphalangeal joint concerning for possible irritation secondary to toe hammering.  On passive motion of the toe there is a palpable pop which causes sharp pain.  Sensation of the toes grossly intact capillary fill less than 2 seconds.    Skin:     Coloration: Skin is not pale.     Neurological:      Mental Status: He is alert and easily aroused.     Psychiatric:         Attention and Perception: Attention and perception normal.         Mood and Affect: Mood and affect normal.         Behavior: Behavior normal. Behavior is cooperative.     Splint application    Date/Time: 7/3/2025 10:45 AM    Performed by: Louise Holt PA-C  Authorized by: Louise Holt PA-C    Other Assisting Provider: No    Verbal consent obtained?: Yes    Risks and benefits: Risks, benefits and alternatives were discussed    Consent given by:  Patient  Time Out:     Time out: Immediately prior to the procedure a time out was called    Patient states understanding of procedure being  "performed: Yes    Patient identity confirmed:  Verbally with patient  Pre-procedure details:     Sensation:  Normal    Skin color:  Pink  Procedure details:     Laterality:  Right    Location:  Toe    Cast type: cade tape.    Splint composition: dynamic    Post-procedure details:     Pain:  Improved    Sensation:  Normal    Skin color:  Pink    Patient tolerance of procedure:  Tolerated well, no immediate complications        Portions of the record may have been created with voice recognition software.  Occasional wrong word or \"sound a like\" substitutions may have occurred due to the inherent limitations of voice recognition software.  Read the chart carefully and recognize, using context, where substitutions have occurred.       [1]   Past Medical History:  Diagnosis Date    GERD (gastroesophageal reflux disease)    [2] No past surgical history on file.  [3]   Family History  Problem Relation Name Age of Onset    Migraines Mother      Alopecia Mother      Anxiety disorder Mother      Hodgkin's lymphoma Father      Heart disease Paternal Grandfather      Cancer Paternal Grandfather     [4]   Allergies  Allergen Reactions    Mushroom Extract Complex - Food Allergy Other (See Comments)     \"i think i get an itchy throat\"    Pollen Extract Headache     "

## 2025-07-03 NOTE — PATIENT INSTRUCTIONS
Buddy tape as needed   Take 600 to 800 mg of ibuprofen every 8 hours.  Supplement with Tylenol 1000 mg every 8 hours as needed. May alternate or take together with Ibuprofen.   Ice 20 minutes on 20 minutes off.  Elevate above the level of the heart whenever not in use.  If symptoms or not improved in 3 to 5 days follow-up with PCP or Podiatry.   If symptoms worsen or new symptoms develop report to the emergency room immediately.

## 2025-07-03 NOTE — LETTER
July 3, 2025     Patient: Javier Mercer   YOB: 1990   Date of Visit: 7/3/2025       To Whom It May Concern:    It is my medical opinion that Javier Mercer may return to work on 07/05/2025.    If you have any questions or concerns, please don't hesitate to call.         Sincerely,        Louise Holt PA-C    CC: No Recipients

## 2025-07-16 ENCOUNTER — OFFICE VISIT (OUTPATIENT)
Dept: URGENT CARE | Facility: CLINIC | Age: 35
End: 2025-07-16
Payer: COMMERCIAL

## 2025-07-16 VITALS — TEMPERATURE: 100.4 F | OXYGEN SATURATION: 96 % | HEART RATE: 114 BPM | RESPIRATION RATE: 16 BRPM

## 2025-07-16 DIAGNOSIS — R68.89 FLU-LIKE SYMPTOMS: Primary | ICD-10-CM

## 2025-07-16 PROCEDURE — G0382 LEV 3 HOSP TYPE B ED VISIT: HCPCS | Performed by: PHYSICIAN ASSISTANT

## 2025-07-16 PROCEDURE — S9083 URGENT CARE CENTER GLOBAL: HCPCS | Performed by: PHYSICIAN ASSISTANT

## 2025-07-16 NOTE — PROGRESS NOTES
[facility]  Name: Javier Mercer      : 1990      MRN: [id number]  Encounter Provider: Lesly Phoenix PA-C  Encounter Date: 2025   Encounter department: [facility]  :  Assessment & Plan  Flu-like symptoms             Patient Instructions  Tylenol and ibuprofen. Fluids. Rest.   Follow up with PCP in 3-5 days.  Proceed to  ER if symptoms worsen.    If tests are performed, our office will contact you with results only if changes need to made to the care plan discussed with you at the visit. You can review your full results on [facility].    Chief Complaint:   Chief Complaint   Patient presents with    Cold Like Symptoms     Pt reports feeling sick since Sun- he reports that he is fatigued and having neck pain and facial pain. He is also c/o head pressure.     History of Present Illness   Pt is a 34 yo M with no sig PMH presents to the office c/o fatigue, f/c, HA, and stiff neck x 4 days. Denies sore throat, congestion, cough, n/v/d, urinary symptoms or rashes. Took tylenol is some relief.           Review of Systems   Constitutional:  Positive for chills, fatigue and fever.   HENT:  Negative for congestion and sore throat.    Eyes:  Positive for photophobia.   Respiratory:  Negative for cough and shortness of breath.    Cardiovascular:  Negative for chest pain and palpitations.   Gastrointestinal:  Negative for abdominal pain, diarrhea, nausea and vomiting.   Genitourinary:  Negative for decreased urine volume.   Musculoskeletal:  Positive for neck pain.   Skin:  Negative for rash.   Neurological:  Positive for headaches. Negative for dizziness and light-headedness.     Past Medical History   Past Medical History[1]  Past Surgical History[2]  Family History[3]  he reports that he quit smoking about 18 months ago. His smoking use included cigarettes and e-cigarettes. He has been exposed to tobacco smoke. His smokeless tobacco use includes chew.  "He reports that he does not currently use drugs after having used the following drugs: Marijuana and Methamphetamines. He reports that he does not drink alcohol.  Current Outpatient Medications   Medication Instructions    esomeprazole (NEXIUM) 40 mg, Every morning before breakfast   Allergies[4]     Objective   Pulse (!) 114   Temp 100.4 °F (38 °C)   Resp 16   SpO2 96%      Physical Exam  Vitals and nursing note reviewed.   Constitutional:       Appearance: Normal appearance.   HENT:      Head: Normocephalic and atraumatic.      Right Ear: Tympanic membrane, ear canal and external ear normal.      Left Ear: Tympanic membrane, ear canal and external ear normal.      Nose: Nose normal.      Mouth/Throat:      Mouth: Mucous membranes are moist.      Pharynx: Oropharynx is clear.     Eyes:      Conjunctiva/sclera: Conjunctivae normal.       Cardiovascular:      Rate and Rhythm: Normal rate and regular rhythm.      Heart sounds: Normal heart sounds. No murmur heard.  Pulmonary:      Effort: Pulmonary effort is normal.      Breath sounds: Normal breath sounds. No decreased breath sounds, wheezing or rhonchi.   Lymphadenopathy:      Cervical: No cervical adenopathy.     Skin:     General: Skin is warm and dry.      Capillary Refill: Capillary refill takes less than 2 seconds.     Neurological:      Mental Status: He is alert.       Portions of the record may have been created with voice recognition software.  Occasional wrong word or \"sound a like\" substitutions may have occurred due to the inherent limitations of voice recognition software.  Read the chart carefully and recognize, using context, where substitutions have occurred.       [1]   Past Medical History:  Diagnosis Date    GERD (gastroesophageal reflux disease)    [2] No past surgical history on file.  [3]   Family History  Problem Relation Name Age of Onset    Migraines Mother      Alopecia Mother      Anxiety disorder Mother      Hodgkin's lymphoma Father   " "   Heart disease Paternal Grandfather      Cancer Paternal Grandfather     [4]   Allergies  Allergen Reactions    Mushroom Extract Complex - Food Allergy Other (See Comments)     \"i think i get an itchy throat\"    Pollen Extract Headache     "